# Patient Record
Sex: FEMALE | Race: WHITE | Employment: OTHER | URBAN - METROPOLITAN AREA
[De-identification: names, ages, dates, MRNs, and addresses within clinical notes are randomized per-mention and may not be internally consistent; named-entity substitution may affect disease eponyms.]

---

## 2024-11-21 NOTE — DISCHARGE INSTRUCTIONS
POST OPERATIVE INFORMATION & INSTRUCTIONS FOLLOWING ROBOTIC OR OPEN ABDOMINAL SURGERY  WHAT TO EXPECT AT HOME:     Recovery from surgery is generally 2-6 weeks, but sometimes longer for more strenuous activity.  It is normal to be very tired during this time.     Recuperation varies with each individual.  Some people recover more quickly than others.  Do not be discouraged if you need a little longer to recover.  It is common to have pain along the incisions, temporary bloating/gas pain, or shoulder pain after robotic surgery. This should improve with time and activity.   It often takes several weeks before bladder function returns to normal. It is common for many patients to experience some mild leakage, need to urinate often and immediately. If these problems are persistent and bothersome, please call your doctor's office or discuss at your post-operative visit  If you also had a vaginal surgery, it is normal to have some drainage/discharge or a small amount of vaginal bleeding after surgery which may last up to 6 weeks. You may also pass small pieces of suture for 4-6 weeks after surgery.  This is normal, and stitches are absorbable.     INCISIONS  Showering and bathing:   It is okay to shower 24 hours after your surgery.   Avoid baths/swimming/hot tubs/pools for 4 weeks or until your incisions completely heal. Keeping incisions underwater can affect the healing process.   Your incisions are closed with absorbable sutures and skin glue or surgical tape on top   You may let soapy water run over the incision. There is no need to scrub the incision. Allow the skin glue or surgical tape to fall off on its own.     ACTIVITY   Lifting: No more than 10 pounds for 6 weeks. For reference, a full gallon of milk is 10 pounds. Thus, no lifting laundry, groceries, children, or pets or pushing heavy vacuums, or grocery carts.  No high impact exercises (aerobic activity, using exercise machines, weight-lifting etc.) for 6  weeks.  We encourage you to start walking regularly starting the day after surgery.   You may climb stairs as tolerated  You may return to work 1-4 weeks after surgery, depending on your job and your surgery.  Ask your doctor about your specific situation. Please contact your doctor's office if you need any [id number] letters or medical leave paperwork completed.   Do not put anything in your vagina for 6 weeks after surgery unless otherwise instructed by your doctor (including tampons, douching, sexual intercourse, etc.).     When you begin to have sexual intercourse again, use water soluble lubricants (e.g., K-Y Jelly) for a short period of time. Most of the discomfort that is experienced early improves with time; however, report any long-term discomfort to your doctor.   Do not drive until you feel that you are ready and can safely slam the brakes if needed. Do not drive while you are taking narcotic pain medication  Avoid sitting or lying in bed for more than 2 hours at a time while you are awake to reduce your risk of blood clots.     PAIN      Vaginal soreness and pelvic discomfort are normal for approximately 6 weeks after surgery.    The first 3 days after surgery we recommend that you rotate between Tylenol and ibuprofen so that you are taking one of these medications every 3 to 4 hours while awake. In this way, you can help prevent pain.   Tylenol* and Ibuprofen** should be the first medications you use for pain. Heat or ice may also help. Please take Ibuprofen with food. Some pain medications can cause constipation (see the section on constipation).   After 3 days, you can take Tylenol and Ibuprofen only as needed.   You may have been prescribed a narcotic~ pain medication (Oxycodone, Percocet, Norco, Tylenol #3, Valium, Tramadol, Hydrocodone).  Use narcotic pain medications for severe pain not improved by the above the first few days after surgery. Please transition to Tylenol or ibuprofen only  within the first 2-3 days after surgery if possible.      Pain management plan example:   Step 1: Over the counter medications  Extra strength Tylenol (500 mg) - take 2 tabs (1000 mg) every 6 hours  Ibuprofen (200 mg) - take 3 tabs (600 mg) every 6 hours    For example:   6 AM - take 1000 mg Tylenol  9AM - take 600 mg Ibuprofen  12 PM - again take 1000 mg Tylenol  3 PM - again take 600 mg Ibuprofen  So on and so on…  Step 2: Prescription pain medication  Oxycodone 5 mg: take 1 tab every 8 hours for additional pain if prescribed    PAIN MEDICATION INFORMATION:    For the first 2 days you should take Toradol and Tylenol alternating every 6 hours scheduled. Do not wait for the pain. For example take Toradol at 3pm, Tylenol at 6pm, Toradol at 9pm and so on. This way you can get ahead of any pain before it starts.    *The maximum amount of Tylenol you can take in a 24-hour period is 4000 mg. Taking over this amount could cause liver damage. Make sure that if you are taking additional narcotic pain medication it does not contain acetaminophen, the active ingredient in Tylenol. lf it does, you must account for in your daily total. For example: Norco or Percocet typically contain 325mg of Tylenol. Wean this medication as tolerated. Tylenol is processed by your liver. Do not take Tylenol if you are have a history of liver failure. Do not mix with alcohol.   **You may take 200-800mg of ibuprofen (Advil) every 8 hours as needed for pain after you are done taking the Toradol. Do not take Toradol and Ibuprofen together as they are the same type of medication. This will help with pain from inflammation (swelling). Ibuprofen and Toradol is processed by your kidneys. If you have any history of kidney disease you should avoid ibuprofen, Toradol and all types of NSAIDS (Aleve, Motrin, Advil). Please take ibuprofen with food. Avoid if you have a history of stomach ulcers. If you are taking the maximum dose of ibuprofen (800mg every 8  hours), reduce this amount to 200-400mg ibuprofen every 8 hours as your pain improves.   ~Remember that narcotics are addictive, sedating, and constipating.  You should be taking a stool softener once or twice a day while on this medication. If you are prescribed narcotic pain medication, please use the medication as instructed. Do not use more than instructed. Do not take this medication with alcohol, sedatives, anti-anxiety medications, or sleep aids.      ~ If prescribed, it is important to keep narcotic pills safely stored, as it is at great risk of being stolen or misused by family, friends, or even strangers. Please be sure to dispose of leftover pain medication after you have recovered. You may dispose of unused narcotic medications in the trash with an unpleasant substance such as coffee grounds or cat litter. You can also check FDA.gov to assess which medications can be safely flushed down the toilet.     CONSTIPATION  Miralax daily as a stool softener until you are off of narcotics and your bowel habits are back to normal.  If you have diarrhea, stop the stool softener.    If you have not had a bowel movement 2 days after surgery, you should take Milk of Magnesia, Dulcolax, Metamucil, magnesium citrate or other over-the-counter (OTC) laxatives for relief. Take laxatives with plenty of water.  Do not take Milk of Magnesia or magnesium citrate if you have chronic kidney disease.  If you had a rectocele repair, rectal pressure (feeling like you need to have a bowel movement) is also very common.  However, you should not strain to have a bowel movement or sit on the toilet for extended periods of time.  The feeling like you need to have a bowel movement may just be the swelling from surgery.   Do not use rectal suppositories if you had a rectocele repair for 4 weeks after surgery     HOME MEDICATIONS:  It is uncommon that you would receive an antibiotic prescription to use at home. You received antibiotics  during surgery while in the hospital.  Please resume all of your home medications that you were on before surgery immediately.  If you are on a blood thinning medication, please resume 1 day after surgery unless otherwise instructed.  If you were prescribed vaginal estrogen, you should resume it 6 weeks after surgery unless otherwise instructed.     URINATION AFTER SURGERY  Immediately after surgery, you may have a catheter in place. If so, you may experience urinary urgency and some bladder pressure/pain.  Although these symptoms are often associated with a urinary tract infection, they can also be caused by bladder spasms.  Call our office if you are having fevers in addition to urinary urgency, burning with urination, and bladder pain. A fever (Temp > 101.5 ? F) is more suggestive of an infection.  Before you leave the hospital, you may be asked to perform a voiding trial.  This tests your ability to urinate and empty your bladder after surgery.  If you are able to urinate, you will be discharged home without a catheter.    Even if you pass the voiding trial, there is a small chance that you can go into urinary retention (have difficulty urinating).  If you do not urinate within 4-6 hours of catheter removal and you feel like your bladder is full but you can't urinate, go to your local urologist, local emergency room, or our emergency room for catheter placement.  If this occurs, please call our office so we can schedule an appointment for another voiding trial after the swelling has had time to subside.  If you do not pass the voiding trial prior to discharge, then the nursing staff will replace a Velasquez catheter in your bladder until the swelling resolves.  You will need to follow-up in our clinic or your local urologist's office in 3-4 days to repeat the voiding trial. In some cases you may also be taught how to perform self-catheterization. If this is necessary, further instructions will be provided.    DIET  You can resume a regular diet once you are discharged.  We recommend a light diet at first if you have nausea or vomiting from pain medications or anesthesia.  We also recommend a high-fiber diet to help with bowel movements.    FOLLOW UP  Typical follow-up is between 3-4 weeks after surgery with your doctor's assistant or your doctor  Your doctor's office will contact you regarding the timing of your follow up appointment  Call the number below for your doctor during normal business hours for your follow up appointment(s)      WHEN SHOULD I CALL THE DOCTOR?  If you have a sudden onset of severe abdominal pain with nausea/vomiting please contact your doctor's office immediately.    Call your doctor if you experience any of the following symptoms:   Bright red vaginal bleeding that soaks a heavy pad  Temperature greater than 101.5 ?F (38.5?C)   Persistent vomiting   Worsening pain that is not relieved by over the counter and prescription pain medication   Large amounts of vaginal discharge that is foul smelling, yellow or green that does not improve.    If questions or concerns:   Call [phone] to reach your physician's office or send a [employer] message and either myself or one of my staff members will get back to you as soon as possible.     OR: Go to the nearest Emergency Room if you feel any signs of symptoms that warrant physician's immediate attention.     Beba Hardin DO, [facility]  [phone]       HOME INSTRUCTIONS  AMBSURG HOME CARE INSTRUCTIONS: POST-OP ANESTHESIA  The medication that you received for sedation or general anesthesia can last up to 24 hours. Your judgment and reflexes may be altered, even if you feel like your normal self.      We Recommend:   Do not drive any motor vehicle or bicycle   Avoid mowing the lawn, playing sports, or working with power tools/applicances (power saws, electric knives or mixers)   That you have someone stay with you on your first night home   Do  not drink alcohol or take sleeping pills or tranquilizers   Do not sign legal documents within 24 hours of your procedure   If you had a nerve block for your surgery, take extra care not to put any pressure on your arm or hand for 24 hours    It is normal:  For you to have a sore throat if you had a breathing tube during surgery (while you were asleep!). The sore throat should get better within 48 hours. You can gargle with warm salt water (1/2 tsp in 4 oz warm water) or use a throat lozenge for comfort  To feel muscle aches or soreness especially in the abdomen, chest or neck. The achy feeling should go away in the next 24 hours  To feel weak, sleepy or \"wiped out\". Your should start feeling better in the next 24 hours.   To experience mild discomforts such as sore lip or tongue, headache, cramps, gas pains or a bloated feeling in your abdomen.   To experience mild back pain or soreness for a day or two if you had spinal or epidural anesthesia.   If you had laparoscopic surgery, to feel shoulder pain or discomfort on the day of surgery.   For some patients to have nausea after surgery/anesthesia    If you feel nausea or experience vomiting:   Try to move around less.   Eat less than usual or drink only liquids until the next morning   Nausea should resolve in about 24 hours    If you have a problem when you are at home:    Call your surgeons office   Discharge Instructions: After Your Surgery  You’ve just had surgery. During surgery, you were given medicine called anesthesia to keep you relaxed and free of pain. After surgery, you may have some pain or nausea. This is common. Here are some tips for feeling better and getting well after surgery.   Going home  Your healthcare provider will show you how to take care of yourself when you go home. They'll also answer your questions. Have an adult family member or friend drive you home. For the first 24 hours after your surgery:   Don't drive or use heavy  equipment.  Don't make important decisions or sign legal papers.  Take medicines as directed.  Don't drink alcohol.  Have someone stay with you, if needed. They can watch for problems and help keep you safe.  Be sure to go to all follow-up visits with your healthcare provider. And rest after your surgery for as long as your provider tells you to.   Coping with pain  If you have pain after surgery, pain medicine will help you feel better. Take it as directed, before pain becomes severe. Also, ask your healthcare provider or pharmacist about other ways to control pain. This might be with heat, ice, or relaxation. And follow any other instructions your surgeon or nurse gives you.      Stay on schedule with your medicine.     Tips for taking pain medicine  To get the best relief possible, remember these points:   Pain medicines can upset your stomach. Taking them with a little food may help.  Most pain relievers taken by mouth need at least 20 to 30 minutes to start to work.  Don't wait till your pain becomes severe before you take your medicine. Try to time your medicine so that you can take it before starting an activity. This might be before you get dressed, go for a walk, or sit down for dinner.  Constipation is a common side effect of some pain medicines. Call your healthcare provider before taking any medicines such as laxatives or stool softeners to help ease constipation. Also ask if you should skip any foods. Drinking lots of fluids and eating foods such as fruits and vegetables that are high in fiber can also help. Remember, don't take laxatives unless your surgeon has prescribed them.  Drinking alcohol and taking pain medicine can cause dizziness and slow your breathing. It can even be deadly. Don't drink alcohol while taking pain medicine.  Pain medicine can make you react more slowly to things. Don't drive or run machinery while taking pain medicine.  Your healthcare provider may tell you to take  acetaminophen to help ease your pain. Ask them how much you're supposed to take each day. Acetaminophen or other pain relievers may interact with your prescription medicines or other over-the-counter (OTC) medicines. Some prescription medicines have acetaminophen and other ingredients in them. Using both prescription and OTC acetaminophen for pain can cause you to accidentally overdose. Read the labels on your OTC medicines with care. This will help you to clearly know the list of ingredients, how much to take, and any warnings. It may also help you not take too much acetaminophen. If you have questions or don't understand the information, ask your pharmacist or healthcare provider to explain it to you before you take the OTC medicine.   Managing nausea  Some people have an upset stomach (nausea) after surgery. This is often because of anesthesia, pain, or pain medicine, less movement of food in the stomach, or the stress of surgery. These tips will help you handle nausea and eat healthy foods as you get better. If you were on a special food plan before surgery, ask your healthcare provider if you should follow it while you get better. Check with your provider on how your eating should progress. It may depend on the surgery you had. These general tips may help:   Don't push yourself to eat. Your body will tell you when to eat and how much.  Start off with clear liquids and soup. They're easier to digest.  Next try semi-solid foods as you feel ready. These include mashed potatoes, applesauce, and gelatin.  Slowly move to solid foods. Don’t eat fatty, rich, or spicy foods at first.  Don't force yourself to have 3 large meals a day. Instead eat smaller amounts more often.  Take pain medicines with a small amount of solid food, such as crackers or toast. This helps prevent nausea.  When to call your healthcare provider  Call your healthcare provider right away if you have any of these:   You still have too much pain, or  the pain gets worse, after taking the medicine. The medicine may not be strong enough. Or there may be a complication from the surgery.  You feel too sleepy, dizzy, or groggy. The medicine may be too strong.  Side effects such as nausea or vomiting. Your healthcare provider may advise taking other medicines to .  Skin changes such as rash, itching, or hives. This may mean you have an allergic reaction. Your provider may advise taking other medicines.  The incision looks different (for instance, part of it opens up).  Bleeding or fluid leaking from the incision site, and weren't told to expect that.  Fever of 100.4°F (38°C) or higher, or as directed by your provider.  Call 911  Call 911 right away if you have:   Trouble breathing  Facial swelling    If you have obstructive sleep apnea   You were given anesthesia medicine during surgery to keep you comfortable and free of pain. After surgery, you may have more apnea spells because of this medicine and other medicines you were given. The spells may last longer than normal.    At home:  Keep using the continuous positive airway pressure (CPAP) device when you sleep. Unless your healthcare provider tells you not to, use it when you sleep, day or night. CPAP is a common device used to treat obstructive sleep apnea.  Talk with your provider before taking any pain medicine, muscle relaxants, or sedatives. Your provider will tell you about the possible dangers of taking these medicines.  Contact your provider if your sleeping changes a lot even when taking medicines as directed.

## 2024-11-22 ENCOUNTER — HOSPITAL ENCOUNTER (OUTPATIENT)
Dept: LAB | Facility: HOSPITAL | Age: 74
Discharge: HOME | End: 2024-11-22
Attending: STUDENT IN AN ORGANIZED HEALTH CARE EDUCATION/TRAINING PROGRAM
Payer: MEDICARE

## 2024-11-22 DIAGNOSIS — Z01.818 PRE-OP TESTING: ICD-10-CM

## 2024-11-22 LAB
ANTIBODY SCREEN: NEGATIVE
RH BLOOD TYPE: POSITIVE

## 2024-11-22 PROCEDURE — 86901 BLOOD TYPING SEROLOGIC RH(D): CPT | Performed by: STUDENT IN AN ORGANIZED HEALTH CARE EDUCATION/TRAINING PROGRAM

## 2024-11-22 PROCEDURE — 86900 BLOOD TYPING SEROLOGIC ABO: CPT | Performed by: STUDENT IN AN ORGANIZED HEALTH CARE EDUCATION/TRAINING PROGRAM

## 2024-11-22 PROCEDURE — 86850 RBC ANTIBODY SCREEN: CPT | Performed by: STUDENT IN AN ORGANIZED HEALTH CARE EDUCATION/TRAINING PROGRAM

## 2024-11-22 RX ORDER — LEVOTHYROXINE SODIUM 75 UG/1
75 TABLET ORAL
COMMUNITY

## 2024-11-22 RX ORDER — ATORVASTATIN CALCIUM 20 MG/1
20 TABLET, FILM COATED ORAL NIGHTLY
COMMUNITY

## 2024-11-22 RX ORDER — ESCITALOPRAM OXALATE 10 MG/1
10 TABLET ORAL DAILY
COMMUNITY

## 2024-11-24 NOTE — H&P
75yo F with vaginal bulge, MARKUS and UUI. Still has uterus. Has spotting with pessary. No abnormal PAP. +UTIs.     On VEC but not consistent. H/o endometriosis     Abd surgeries- appendix     Pelvic Surgeries: no      10 point ROS was completed and otherwise negative unless states in HPI           History/Other:         Current Outpatient Medications   Medication Sig Dispense Refill    estradiol (ESTRACE) 0.1 MG/GM Vaginal Cream Place 1 g vaginally twice a week. 1 each 3    citalopram 20 MG Oral Tab TAKE ONE AND ONE-HALF TABLETS DAILY 135 tablet 3    valACYclovir (VALTREX) 1 G Oral Tab 2 tablets at onset of cold sore, then 2 tablets 12 hours later. 12 tablet 3    Calcium Citrate-Vitamin D (CITRACAL + D OR) Take  by mouth.        ciprofloxacin (CIPRO) 500 MG Oral Tab Take 1 tablet (500 mg total) by mouth 2 (two) times daily. 6 tablet 0    PEG 3350-KCl-Na Bicarb-NaCl 420 g Oral Recon Soln Take 4,000 mL (4 L total) by mouth As Directed. 4000 mL 0    predniSONE 20 MG Oral Tab 2 tablets daily for 3 days 6 tablet 0    ondansetron 4 MG Oral Tablet Dispersible Take 1 tablet (4 mg total) by mouth every 6 (six) hours as needed for Nausea. 20 tablet 0    triamcinolone acetonide (ORALONE) 0.1 % Mouth/Throat Paste Apply 4 times a day to affected area after meals 5 g 1           Past Medical History:   Diagnosis Date    Anxiety state      OTHER DISEASES       H/O PPD(+) - RX X 6 MONTHS IN 6TH GRADE, H/OO PRE-CANCEROUS SKIN LESION, HERPES LABIALIS    Tubular adenoma 11/14/2023    Vitamin D deficiency 12/27/2013              Past Surgical History:   Procedure Laterality Date    COLONOSCOPY N/A 10/27/2023     Procedure: COLONOSCOPY w/ polypectomy;  Surgeon: Jonnie Oreilly MD;  Location: [facility], [facility]    OTHER SURGICAL HISTORY         VENTRAL HERNIA REPAIR, SCLEROTHERAPY    TUBAL LIGATION           Social History    Tobacco Use      Smoking status: Never      Smokeless tobacco: Never    Vaping Use      Vaping status: Never  Used    Alcohol use: Yes      Comment: SOCIAL    Drug use: No               Family History   Problem Relation Age of Onset    Hypertension Father      Lipids Father      Breast Cancer Sister 43         Age at dx 43    Other (Other) Sister           HYPOTHYROID    Hormone Disorder Mother           hysterectomy young age    Heart Disease Maternal Grandmother           cva    Hypertension Maternal Grandmother      Heart Disease Maternal Grandfather           cva    Heart Disease Paternal Grandmother           cva    Hypertension Paternal Grandmother      Hypertension Paternal Grandfather      Heart Disease Paternal Grandfather           cva               Objective:  GENERAL: well developed, well nourished, in no apparent distress  HEENT: atraumatic, normocephalic  LUNGS: normal respiratory motion without distress  CARDIO:NA  ABDOMEN: Soft, non-tender, non-distended  : Normal external genitalia with no lesions or discharge: yes; Vaginal Atrophy yes  Ba +1.5 C -4  Bp +0.5 gh 5 TVL 8 D -5  Pelvic Floor TTP: none  MARKUS: +  Masses Appreciated: no  NEURO: Alert, oriented, no focal deficits   EXTREMITIES: Edema no     Cystometrics: no                 Lab Results   Component Value Date     BILIRUBIN Negative 10/04/2023     SPECGRAVITY 1.006 10/04/2023     BLOODU Negative 10/04/2023     UROBILIN <=1.0 10/04/2023     NITRITE Positive (A) 10/04/2023            PVR: 2ml                 Assessment & Plan:  73yo F  Diagnoses and all orders for this visit:     MARKUS (stress urinary incontinence, female)  -     MEASUREMENT, POST-VOIDING RESIDUAL URINE &/OR BLADDER CAPACITY, US, NON-IMAGING     Urge incontinence of urine     Midline cystocele     Incomplete uterovaginal prolapse     Rectocele     Plan:  - Assessment/Plan:  1. Pelvic organ prolapse  Discussed the natural history of pelvic organ prolapse. Prolapse is unlikely to self-resolve, although may not necessarily worsen with time with care in avoidance of abdominal straining,  heavy lifting, and weight gain.  All the treatment options were reviewed including      1) Watchful waiting particularly if symptoms are non-bothersome. Should have a good bowel regimen, avoiding constipation and straining. Avoiding heavy weightlifting if possible. Exhaling with lifting to decrease transmission of force to pelvis.       2) Kegel exercise/strenthing - would not anatomically correct the prolapse but may decrease the sensation of a vaginal bulge.       3) pessary placement- would require removal and cleaning regularly (not necessarily daily) that could be performed by the patient with proper instruction or managed by our physician assistant.       4) Surgical repair - which would be an outpatient procedure and require pelvic rest + lifting restrictions for at least 6 weeks.      -Surgical options including vaginal, robotic and open abdominal operations were discussed at length as well as the risks and benefits incurred by treatment option.     -A concurrent mid-urethral sling procedure was also discussed for the treatment of MARKUS/prevention of de linh MARKUS after prolapse repair. All relevant recent data discussed with patient as well as risks associate with mesh.     Given the above the patient has elected to undergo robotic assisted sacrocolopopexy, supracervical hysterectomy, bilateral salpingectomy, posterior repair, midurethral sling placement and cystoscopy after thorough education provided        The patient indicates understanding of these issues and agrees to the plan.      Beba Hardin DO, [facility]

## 2024-11-25 ENCOUNTER — ANESTHESIA EVENT (OUTPATIENT)
Dept: SURGERY | Facility: HOSPITAL | Age: 74
End: 2024-11-25
Payer: MEDICARE

## 2024-11-25 ENCOUNTER — HOSPITAL ENCOUNTER (OUTPATIENT)
Facility: HOSPITAL | Age: 74
Setting detail: HOSPITAL OUTPATIENT SURGERY
Discharge: HOME | End: 2024-11-25
Attending: STUDENT IN AN ORGANIZED HEALTH CARE EDUCATION/TRAINING PROGRAM | Admitting: STUDENT IN AN ORGANIZED HEALTH CARE EDUCATION/TRAINING PROGRAM
Payer: MEDICARE

## 2024-11-25 ENCOUNTER — ANESTHESIA (OUTPATIENT)
Dept: SURGERY | Facility: HOSPITAL | Age: 74
End: 2024-11-25
Payer: MEDICARE

## 2024-11-25 VITALS
OXYGEN SATURATION: 94 % | RESPIRATION RATE: 15 BRPM | HEIGHT: 65 IN | SYSTOLIC BLOOD PRESSURE: 131 MMHG | DIASTOLIC BLOOD PRESSURE: 48 MMHG | BODY MASS INDEX: 24.61 KG/M2 | HEART RATE: 80 BPM | WEIGHT: 147.69 LBS | TEMPERATURE: 98 F

## 2024-11-25 DIAGNOSIS — Z01.818 PRE-OP TESTING: Primary | ICD-10-CM

## 2024-11-25 DIAGNOSIS — N81.2 INCOMPLETE UTEROVAGINAL PROLAPSE: ICD-10-CM

## 2024-11-25 LAB — RH BLOOD TYPE: POSITIVE

## 2024-11-25 PROCEDURE — 0UT94ZL RESECTION OF UTERUS, SUPRACERVICAL, PERCUTANEOUS ENDOSCOPIC APPROACH: ICD-10-PCS | Performed by: STUDENT IN AN ORGANIZED HEALTH CARE EDUCATION/TRAINING PROGRAM

## 2024-11-25 PROCEDURE — 88307 TISSUE EXAM BY PATHOLOGIST: CPT | Performed by: STUDENT IN AN ORGANIZED HEALTH CARE EDUCATION/TRAINING PROGRAM

## 2024-11-25 PROCEDURE — 0USG4ZZ REPOSITION VAGINA, PERCUTANEOUS ENDOSCOPIC APPROACH: ICD-10-PCS | Performed by: STUDENT IN AN ORGANIZED HEALTH CARE EDUCATION/TRAINING PROGRAM

## 2024-11-25 PROCEDURE — 8E0W4CZ ROBOTIC ASSISTED PROCEDURE OF TRUNK REGION, PERCUTANEOUS ENDOSCOPIC APPROACH: ICD-10-PCS | Performed by: STUDENT IN AN ORGANIZED HEALTH CARE EDUCATION/TRAINING PROGRAM

## 2024-11-25 PROCEDURE — 0TSD4ZZ REPOSITION URETHRA, PERCUTANEOUS ENDOSCOPIC APPROACH: ICD-10-PCS | Performed by: STUDENT IN AN ORGANIZED HEALTH CARE EDUCATION/TRAINING PROGRAM

## 2024-11-25 PROCEDURE — 0UT74ZZ RESECTION OF BILATERAL FALLOPIAN TUBES, PERCUTANEOUS ENDOSCOPIC APPROACH: ICD-10-PCS | Performed by: STUDENT IN AN ORGANIZED HEALTH CARE EDUCATION/TRAINING PROGRAM

## 2024-11-25 DEVICE — TRADITIONAL Y MESH
Type: IMPLANTABLE DEVICE | Site: VAGINA | Status: FUNCTIONAL
Brand: UPSYLON™

## 2024-11-25 DEVICE — TRANSVAGINAL MID-URETHRAL SLING
Type: IMPLANTABLE DEVICE | Site: VAGINA | Status: FUNCTIONAL
Brand: ADVANTAGE FIT™ BLUE SYSTEM

## 2024-11-25 RX ORDER — HYDROMORPHONE HYDROCHLORIDE 1 MG/ML
0.4 INJECTION, SOLUTION INTRAMUSCULAR; INTRAVENOUS; SUBCUTANEOUS EVERY 5 MIN PRN
Status: DISCONTINUED | OUTPATIENT
Start: 2024-11-25 | End: 2024-11-25

## 2024-11-25 RX ORDER — HYDROMORPHONE HYDROCHLORIDE 1 MG/ML
0.6 INJECTION, SOLUTION INTRAMUSCULAR; INTRAVENOUS; SUBCUTANEOUS EVERY 5 MIN PRN
Status: DISCONTINUED | OUTPATIENT
Start: 2024-11-25 | End: 2024-11-25

## 2024-11-25 RX ORDER — KETOROLAC TROMETHAMINE 10 MG/1
10 TABLET, FILM COATED ORAL EVERY 6 HOURS PRN
Qty: 20 TABLET | Refills: 0 | Status: SHIPPED | OUTPATIENT
Start: 2024-11-25 | End: 2024-11-30

## 2024-11-25 RX ORDER — ROCURONIUM BROMIDE 10 MG/ML
INJECTION, SOLUTION INTRAVENOUS AS NEEDED
Status: DISCONTINUED | OUTPATIENT
Start: 2024-11-25 | End: 2024-11-25 | Stop reason: SURG

## 2024-11-25 RX ORDER — SCOLOPAMINE TRANSDERMAL SYSTEM 1 MG/1
1 PATCH, EXTENDED RELEASE TRANSDERMAL
Status: DISCONTINUED | OUTPATIENT
Start: 2024-11-25 | End: 2024-11-25 | Stop reason: HOSPADM

## 2024-11-25 RX ORDER — MIDAZOLAM HYDROCHLORIDE 1 MG/ML
INJECTION INTRAMUSCULAR; INTRAVENOUS AS NEEDED
Status: DISCONTINUED | OUTPATIENT
Start: 2024-11-25 | End: 2024-11-25 | Stop reason: SURG

## 2024-11-25 RX ORDER — ACETAMINOPHEN 500 MG
1000 TABLET ORAL ONCE
Status: COMPLETED | OUTPATIENT
Start: 2024-11-25 | End: 2024-11-25

## 2024-11-25 RX ORDER — SODIUM CHLORIDE 9 MG/ML
INJECTION, SOLUTION INTRAVENOUS CONTINUOUS PRN
Status: DISCONTINUED | OUTPATIENT
Start: 2024-11-25 | End: 2024-11-25 | Stop reason: SURG

## 2024-11-25 RX ORDER — LIDOCAINE HYDROCHLORIDE 10 MG/ML
INJECTION, SOLUTION EPIDURAL; INFILTRATION; INTRACAUDAL; PERINEURAL AS NEEDED
Status: DISCONTINUED | OUTPATIENT
Start: 2024-11-25 | End: 2024-11-25 | Stop reason: SURG

## 2024-11-25 RX ORDER — HYDRALAZINE HYDROCHLORIDE 20 MG/ML
INJECTION INTRAMUSCULAR; INTRAVENOUS AS NEEDED
Status: DISCONTINUED | OUTPATIENT
Start: 2024-11-25 | End: 2024-11-25 | Stop reason: SURG

## 2024-11-25 RX ORDER — MORPHINE SULFATE 4 MG/ML
2 INJECTION, SOLUTION INTRAMUSCULAR; INTRAVENOUS EVERY 10 MIN PRN
Status: DISCONTINUED | OUTPATIENT
Start: 2024-11-25 | End: 2024-11-25

## 2024-11-25 RX ORDER — MORPHINE SULFATE 4 MG/ML
4 INJECTION, SOLUTION INTRAMUSCULAR; INTRAVENOUS EVERY 10 MIN PRN
Status: DISCONTINUED | OUTPATIENT
Start: 2024-11-25 | End: 2024-11-25

## 2024-11-25 RX ORDER — ONDANSETRON 2 MG/ML
INJECTION INTRAMUSCULAR; INTRAVENOUS AS NEEDED
Status: DISCONTINUED | OUTPATIENT
Start: 2024-11-25 | End: 2024-11-25 | Stop reason: SURG

## 2024-11-25 RX ORDER — ESMOLOL HYDROCHLORIDE 10 MG/ML
INJECTION INTRAVENOUS AS NEEDED
Status: DISCONTINUED | OUTPATIENT
Start: 2024-11-25 | End: 2024-11-25 | Stop reason: SURG

## 2024-11-25 RX ORDER — NALOXONE HYDROCHLORIDE 0.4 MG/ML
0.08 INJECTION, SOLUTION INTRAMUSCULAR; INTRAVENOUS; SUBCUTANEOUS AS NEEDED
Status: DISCONTINUED | OUTPATIENT
Start: 2024-11-25 | End: 2024-11-25

## 2024-11-25 RX ORDER — HEPARIN SODIUM 5000 [USP'U]/ML
5000 INJECTION, SOLUTION INTRAVENOUS; SUBCUTANEOUS ONCE
Status: COMPLETED | OUTPATIENT
Start: 2024-11-25 | End: 2024-11-25

## 2024-11-25 RX ORDER — PHENAZOPYRIDINE HYDROCHLORIDE 200 MG/1
200 TABLET, FILM COATED ORAL ONCE
Status: COMPLETED | OUTPATIENT
Start: 2024-11-25 | End: 2024-11-25

## 2024-11-25 RX ORDER — MORPHINE SULFATE 10 MG/ML
6 INJECTION, SOLUTION INTRAMUSCULAR; INTRAVENOUS EVERY 10 MIN PRN
Status: DISCONTINUED | OUTPATIENT
Start: 2024-11-25 | End: 2024-11-25

## 2024-11-25 RX ORDER — SODIUM CHLORIDE, SODIUM LACTATE, POTASSIUM CHLORIDE, CALCIUM CHLORIDE 600; 310; 30; 20 MG/100ML; MG/100ML; MG/100ML; MG/100ML
INJECTION, SOLUTION INTRAVENOUS CONTINUOUS
Status: DISCONTINUED | OUTPATIENT
Start: 2024-11-25 | End: 2024-11-25

## 2024-11-25 RX ORDER — KETOROLAC TROMETHAMINE 15 MG/ML
15 INJECTION, SOLUTION INTRAMUSCULAR; INTRAVENOUS ONCE
Status: COMPLETED | OUTPATIENT
Start: 2024-11-25 | End: 2024-11-25

## 2024-11-25 RX ORDER — POLYETHYLENE GLYCOL 3350 17 G/17G
17 POWDER, FOR SOLUTION ORAL DAILY PRN
Qty: 72 EACH | Refills: 0 | Status: SHIPPED | OUTPATIENT
Start: 2024-11-25 | End: 2024-12-25

## 2024-11-25 RX ORDER — DEXAMETHASONE SODIUM PHOSPHATE 4 MG/ML
VIAL (ML) INJECTION AS NEEDED
Status: DISCONTINUED | OUTPATIENT
Start: 2024-11-25 | End: 2024-11-25 | Stop reason: SURG

## 2024-11-25 RX ORDER — BUPIVACAINE HYDROCHLORIDE 2.5 MG/ML
INJECTION, SOLUTION EPIDURAL; INFILTRATION; INTRACAUDAL AS NEEDED
Status: DISCONTINUED | OUTPATIENT
Start: 2024-11-25 | End: 2024-11-25 | Stop reason: HOSPADM

## 2024-11-25 RX ORDER — HYDROMORPHONE HYDROCHLORIDE 1 MG/ML
0.2 INJECTION, SOLUTION INTRAMUSCULAR; INTRAVENOUS; SUBCUTANEOUS EVERY 5 MIN PRN
Status: DISCONTINUED | OUTPATIENT
Start: 2024-11-25 | End: 2024-11-25

## 2024-11-25 RX ADMIN — DEXAMETHASONE SODIUM PHOSPHATE 4 MG: 4 MG/ML VIAL (ML) INJECTION at 12:06:00

## 2024-11-25 RX ADMIN — ESMOLOL HYDROCHLORIDE 40 MG: 10 INJECTION INTRAVENOUS at 15:00:00

## 2024-11-25 RX ADMIN — ROCURONIUM BROMIDE 10 MG: 10 INJECTION, SOLUTION INTRAVENOUS at 13:35:00

## 2024-11-25 RX ADMIN — MIDAZOLAM HYDROCHLORIDE 2 MG: 1 INJECTION INTRAMUSCULAR; INTRAVENOUS at 11:31:00

## 2024-11-25 RX ADMIN — HYDRALAZINE HYDROCHLORIDE 2 MG: 20 INJECTION INTRAMUSCULAR; INTRAVENOUS at 12:27:00

## 2024-11-25 RX ADMIN — HYDRALAZINE HYDROCHLORIDE 2 MG: 20 INJECTION INTRAMUSCULAR; INTRAVENOUS at 13:35:00

## 2024-11-25 RX ADMIN — ROCURONIUM BROMIDE 50 MG: 10 INJECTION, SOLUTION INTRAVENOUS at 11:39:00

## 2024-11-25 RX ADMIN — HYDRALAZINE HYDROCHLORIDE 4 MG: 20 INJECTION INTRAMUSCULAR; INTRAVENOUS at 12:44:00

## 2024-11-25 RX ADMIN — ONDANSETRON 4 MG: 2 INJECTION INTRAMUSCULAR; INTRAVENOUS at 14:27:00

## 2024-11-25 RX ADMIN — SODIUM CHLORIDE: 9 INJECTION, SOLUTION INTRAVENOUS at 11:32:00

## 2024-11-25 RX ADMIN — ROCURONIUM BROMIDE 10 MG: 10 INJECTION, SOLUTION INTRAVENOUS at 13:24:00

## 2024-11-25 RX ADMIN — SODIUM CHLORIDE: 9 INJECTION, SOLUTION INTRAVENOUS at 12:14:00

## 2024-11-25 RX ADMIN — SODIUM CHLORIDE, SODIUM LACTATE, POTASSIUM CHLORIDE, CALCIUM CHLORIDE: 600; 310; 30; 20 INJECTION, SOLUTION INTRAVENOUS at 11:28:00

## 2024-11-25 RX ADMIN — LIDOCAINE HYDROCHLORIDE 50 MG: 10 INJECTION, SOLUTION EPIDURAL; INFILTRATION; INTRACAUDAL; PERINEURAL at 11:39:00

## 2024-11-25 NOTE — ANESTHESIA PREPROCEDURE EVALUATION
Anesthesia PreOp Note    HPI:     Arabella Dwyer is a 74 year old female who presents for preoperative consultation requested by: Beba Hardin DO    Date of Surgery: 11/25/2024    Procedure(s):  Robotic assisted sacrocolpopexy, supracervical hysterectomy, bilateral salpingectomy, posterior repair, midurethral sling placement, cystoscopy  XI ROBOT-ASSISTED LAPAROSCOPIC HYSTERECTOMY  ANTERIOR POSTERIOR REPAIR  CYSTOSCOPY  PUBO VAGINAL SLING  Indication: Complete uterovaginal prolapse, rectocele, stress urinary incontinence    Relevant Problems   No relevant active problems       NPO:                         History Review:  There are no active problems to display for this patient.      Past Medical History:    Anxiety state    Disorder of thyroid    High cholesterol    Hx of motion sickness       Past Surgical History:   Procedure Laterality Date    Appendectomy         Prescriptions Prior to Admission[1]  Current Medications and Prescriptions Ordered in Epic[2]    Allergies[3]    Family History   Problem Relation Age of Onset    Heart Disorder Father     Heart Disorder Mother      Social History     Socioeconomic History    Marital status:    Tobacco Use    Smoking status: Never    Smokeless tobacco: Never   Vaping Use    Vaping status: Never Used   Substance and Sexual Activity    Alcohol use: Yes     Comment: socially    Drug use: Never       Available pre-op labs reviewed.             Vital Signs:  Body mass index is 24.46 kg/m².   height is 1.651 m (5' 5\") and weight is 66.7 kg (147 lb).   Vitals:    11/22/24 0843   Weight: 66.7 kg (147 lb)   Height: 1.651 m (5' 5\")        Anesthesia Evaluation      Airway   Mallampati: II  TM distance: >3 FB  Neck ROM: full  Dental - Dentition appears grossly intact     Pulmonary - normal exam   Cardiovascular - normal exam  Exercise tolerance: good    Neuro/Psych    (+)  anxiety/panic attacks,        GI/Hepatic/Renal      Endo/Other    Abdominal                   Anesthesia Plan:   ASA:  2  Plan:   General  Monitors and Lines:   BIS  Airway:  ETT  Informed Consent Plan and Risks Discussed With:  Patient and spouse      I have informed Arabella Dwyer and/or legal guardian or family member of the nature of the anesthetic plan, benefits, risks including possible dental damage if relevant, major complications, and any alternative forms of anesthetic management.   All of the patient's questions were answered to the best of my ability. The patient desires the anesthetic management as planned.  Carito Trivedi MD  11/25/2024 10:20 AM  Present on Admission:  **None**           [1]   Medications Prior to Admission   Medication Sig Dispense Refill Last Dose/Taking    levothyroxine 75 MCG Oral Tab Take 1 tablet (75 mcg total) by mouth before breakfast. Daily except Sundays   Taking    atorvastatin 20 MG Oral Tab Take 1 tablet (20 mg total) by mouth nightly.   Taking    escitalopram 10 MG Oral Tab Take 1 tablet (10 mg total) by mouth daily.   Taking    Cholecalciferol (VITAMIN D3) 25 MCG (1000 UT) Oral Cap Take 1 tablet by mouth daily.   11/18/2024    Multiple Vitamins-Minerals (MULTIVITAMIN GUMMIES ADULTS OR) Take 1 Dose by mouth daily.   11/18/2024    Potassium Citrate-Mag Citrate 100-15 MG Oral Tab CR Take 200 mg by mouth daily.   11/18/2024   [2]   Current Facility-Administered Medications Ordered in Epic   Medication Dose Route Frequency Provider Last Rate Last Admin    lactated ringers infusion   Intravenous Continuous Beba Hardin DO        acetaminophen (Tylenol Extra Strength) tab 1,000 mg  1,000 mg Oral Once Beba Hardin DO        ceFAZolin (Ancef) 2g in 10mL IV syringe premix  2 g Intravenous Once Beba Hardin DO        heparin (Porcine) 5000 UNIT/ML injection 5,000 Units  5,000 Units Subcutaneous Once Beba Hardin,         phenazopyridine (Pyridum) tab 200 mg  200 mg Oral Once Beba Hardin,          No current [facility]-ordered outpatient medications on file.   [3] No  Known Allergies

## 2024-11-25 NOTE — ANESTHESIA POSTPROCEDURE EVALUATION
Patient: Arabella Dwyer    Procedure Summary       Date: 11/25/24 Room / Location: [facility] MAIN OR  / [facility] MAIN OR    Anesthesia Start: 1128 Anesthesia Stop: 1511    Procedures:       Robotic assisted sacrocolpopexy, supracervical hysterectomy, uterus less than 250 grams, bilateral salpingectomy, midurethral sling placement, cystoscopy, perineorrhaphy (Abdomen)      XI ROBOT-ASSISTED LAPAROSCOPIC HYSTERECTOMY (Abdomen)      CYSTOSCOPY (Urethra)      PUBO VAGINAL SLING (Vagina ) Diagnosis: (Complete uterovaginal prolapse, rectocele, stress urinary incontinence)    Surgeons: Beba Hardin DO Anesthesiologist: Carito Trivedi MD    Anesthesia Type: general ASA Status: 2            Anesthesia Type: general    Vitals Value Taken Time   /61 11/25/24 1520   Temp 97.8 °F (36.6 °C) 11/25/24 1510   Pulse 78 11/25/24 1523   Resp 13 11/25/24 1523   SpO2 99 % 11/25/24 1523   Vitals shown include unfiled device data.    [facility] AN Post Evaluation:   Patient Evaluated in PACU  Patient Participation: complete - patient participated  Level of Consciousness: awake  Pain Management: adequate  Airway Patency:patent  Yes    Nausea/Vomiting: none  Cardiovascular Status: hemodynamically stable  Respiratory Status: acceptable  Postoperative Hydration stable      Carito Trivedi MD  11/25/2024 3:24 PM

## 2024-11-25 NOTE — ANESTHESIA PROCEDURE NOTES
Airway  Date/Time: 11/25/2024 11:39 AM  Urgency: Elective    Airway not difficult    General Information and Staff    Patient location during procedure: OR  Anesthesiologist: Carito Trivedi MD  Performed: anesthesiologist   Performed by: Carito Trivedi MD  Authorized by: Carito Trivedi MD      Indications and Patient Condition  Indications for airway management: anesthesia  Spontaneous Ventilation: absent  Sedation level: deep  Preoxygenated: yes  Patient position: sniffing  Mask difficulty assessment: 1 - vent by mask    Final Airway Details  Final airway type: endotracheal airway      Successful airway: ETT  Cuffed: yes   Successful intubation technique: Video laryngoscopy  Facilitating devices/methods: intubating stylet  Endotracheal tube insertion site: oral  Blade: GlideScope  Blade size: #3  ETT size (mm): 7.0    Cormack-Lehane Classification: grade IIA - partial view of glottis  Placement verified by: capnometry   Measured from: lips  ETT to lips (cm): 20  Number of attempts at approach: 1  Ventilation between attempts: none  Number of other approaches attempted: 0    Additional Comments  Atraumatic, dentition intact.  Oral gastric suction.

## 2024-11-25 NOTE — ANESTHESIA PROCEDURE NOTES
Peripheral IV  Date/Time: 11/25/2024 11:44 AM  Inserted by: Carito Trivedi MD    Placement  Needle size: 18 G  Laterality: right  Location: forearm  Local anesthetic: none  Site prep: chlorhexidine  Technique: anatomical landmarks  Attempts: 1 (Sterile dressing)

## 2024-11-25 NOTE — INTERVAL H&P NOTE
Pre-op Diagnosis: Complete uterovaginal prolapse, rectocele, stress urinary incontinence    The above referenced H&P was reviewed by Beba Hardin DO on 11/25/2024, the patient was examined and no significant changes have occurred in the patient's condition since the H&P was performed.  I discussed with the patient and/or legal representative the potential benefits, risks and side effects of this procedure; the likelihood of the patient achieving goals; and potential problems that might occur during recuperation.  I discussed reasonable alternatives to the procedure, including risks, benefits and side effects related to the alternatives and risks related to not receiving this procedure.  We will proceed with procedure as planned.

## 2024-11-25 NOTE — OPERATIVE REPORT
[facility]  part of [facility]    Operative Note         Arabella Dwyer Location: OR   CSN [id number] MRN [id number]   Admission Date 11/25/2024 Operation Date 11/25/2024   Attending Physician Beba Hardin DO       Patient Name: Arabella Dwyer     Preoperative Diagnosis: Complete uterovaginal prolapse, rectocele, stress urinary incontinence     Postoperative Diagnosis: Complete uterovaginal prolapse, rectocele, stress urinary incontinence     Procedure(s):  Robotic assisted sacrocolpopexy, supracervical hysterectomy, uterus less than 250 grams, bilateral salpingectomy, midurethral sling placement, cystoscopy, perineorrhaphy      Primary Surgeon: Beba Hardin DO     Surgical Assistant.: William Pham     Anesthesia: General     Specimen:   ID Type Source Tests Collected by Time Destination   1 : 1. Uterus and bilateral fallopian tubes Tissue Uterus, fallopian tube(s) SURGICAL PATHOLOGY TISSUE Beba Hardin DO 11/25/2024 1251         Estimated Blood Loss: 40ml  Complexity: Stage IV prolapse with floppy bladder, small atrophic cervix and redundant vagina made dissection prolonged and more difficult  Complications: none      Surgical Findings:   -Redundant floppy vagina and bladder from severe prolapse  -Complete reduction of prolapse  -b/l ureteral efflux      Operative Summary:       73yo female with bothersome stage IV uterovaginal prolapse. She was counseled on management options for the prolapse including abdominal and vaginal approaches with or without uterine sparing. Given MARKUS on her preop exam, midurethral sling was also recommended. She elected to proceed in fully informed fashion after discussion of procedures, alternatives, benefits, and risks.     An 16-Fr catheter was placed in the bladder. We began gaining access to the abdomen with a Veress needle in the midline. The abdomen was insufflated and an 8mm port was then introduced. The abdominal cavity was inspected with a 30-degree  camera. Two additional 8 mm robotic ports were placed on the patient's left side. An additional robotic port was placed on the patient's right side and an 8 mm assistant airseal port. The skin was infiltrated with Marcaine prior to an incision at each port site. Each port was then visually watched in. Port sites were at least 8-9 cm away from each other in a straight across configuration. Airseal was used for the case. Once all ports were in place the robot was docked from the side.     Once the robotic arms were docked, monopolar chelsy, bipolar graspers and a prograsp were placed in the arms.      The peritoneum over the sacral promontory was incised. The anterior longitudinal ligament was cleaned off, a trough was created down the peritoneum and opened towards the apex of the vagina. The right ureter was visible throughout dissection and unharmed.     Attention was then turned to the peritoneum at the bladder reflection which was tented up and incised with Monopolar current to create the bladder flap which was advanced to peel the bladder away from the cervix. The vagina and bladder were very floppy and redundant from severe prolapse making dissection difficult. I backfilled filled the bladder after dissection and no injury noted. We proceeded to just above the site of the chatterjee balloon.  Peritoneum was dissected off post vag wall in similar fashion. We then started the supracervical hysterectomy on the right side. Both ovaries were inspected and appeared otherwise normal. The right uteroovarian ligament and mesosalpinx were cauterized to release the right fallopian tube. Bipolar energy was applied along the broad ligament to cauterize and incise down to the junction with the cervix. Care was taken to cauterize all uterine arteries encountered.  This process was repeated on the contralateral side without difficulty, cauterizing the left uteroovarian ligament and mesosalpinx to release the left fallopian tube.  Then the broad ligament was cauterized as above. The body of the uterus was then amputated from the cervix with monopolar scissors. This was also difficult because the cervix was so small and atrophic. I was able to amputate the uterus at the cervix however without getting into the vagina. The uterus was placed in the right paracolic gutter.       We then continued developing the anterior and posterior leafs of peritoneum from the vaginal wall. Once these areas were developed, Y-shaped mesh was delivered into the abdomen. Using 2-0 PDS, the Y mesh was affixed to the anterior vaginal wall and apex. The posterior arm of the mesh was fixated to the posterior vaginal wall and apex. Excess mesh was cut and removed. Once these were secured we laid the mesh on our previously developed peritoneal trough to the anterior longitudinal ligament. The EEA sizer was placed in the vagina to determine the mesh fixation point and a vaginal exam confirmed adequate prolapse reduction without hypersuspension. Once that was evaluated, we then placed 0-0 Prolene to fix the mesh to the sacral promontory. The mesh was sutured at two places to the sacral promontory. We then retroperitonealized the mesh using an 0-0 V-lock absorbable suture in a running fashion. No bleeding was seen on inspection. Cystoscopy confirmed no sutures in the bladder and bilateral ureteral efflux.     The uterus and fallopian tubes were placed in a specimen bag. The ports were then withdrawn under direct visualization. Pneumoperitoneum was let down and then the robot undocked. The uterus and fallopian tubes were removed from the supraumbilical incision by extending the incision slightly. The supraumbilical fascial incision was then closed with Vicryl 0-0 sutures in a running fashion to adequately close the fascia. All wounds were irrigated copiously, the skin at all ports was closed using 4-0 Vicryl subcuticular fashion. Dermabond was applied over them.      Next I  moved onto the sling portion. Lidocaine with 1:100K parts epi was used to hydrodissect the anterior vaginal wall 1 cm distal to the urethral meatus.  A 15 blade was used to make a 2 cm incision through the vaginal epithelium in this area anteriorly.  More local was used to dissect flaps in the fornixes lateral to the incision.  Then, using Metzenbaum scissors flaps were raised to accommodate the trocars.     A 15 blade was used to two stable incisions approximately 2 cm from midline on the R and L in the mons overlying the pubic bone.  The trocars for the retropubic [employer] sling were then passed from the vaginal pocket and on both the L and R side to the mons incision using down-up approach .  The vagina was inspected to confirm no epithelial injury in the vaginal sulcus.  A cysto with a 70-degree lens revealed the left trocar going through the bladder. This trocar was removed and replaced. A ycstoscopy was done again which now showed both trocars in good position, rolling over the bladder epithelium without evidence of injury to the bladder or urethra.      Then the mesh sling was attached to the trocars and pulled through. The sling was allowed to rest gently anterior to the urethra off tension.  Curved pompa scissors were passed behind the sling in front of the urethra to confirm a good distance between the implant and tissue. The plastic coating was pulled off the sling and the mesh was trimmed at the level of the skin incision.     Then the vaginal incision was closed with a 2.0 vicryl in a running fashion. Every other stitch was locked. On final assessment, the vaginal vault was hemostatic. The stab incisions were closed with 4-0 Monocryl.         Next, attention was then turned to the perineorrhaphy. Two Allis clamps were placed along the hymenal ring at the posterior aspect.  Lidocaine with epi was injected below the vaginal epithelium in the area of the widened hiatus. A john  incision was made through the perineal epithelium with knife. The perineal body was rebuilt also with 0-0 Vicryl. Next, the vaginal and perineal incision was closed using running locked suture of 2-0 Vicryl.  The vaginal vault was hemostatic at case conclusion. Vaginal sweep negative.     All sponge, instrument, and needle counts were reported as correct. The chatterjee catheter was left in place and will be removed in PACU for a voiding trial.     The patient was cleaned, dried, and repositioned supine. She was extubated and transported to the recovery room for further postoperative monitoring.    Implants:   Implant Name Type Inv. Item Serial No.  Lot No. LRB No. Used Action   MESH LINDA VAG  YANIRA Y LTWT LO SURF AREA - SN/A  MESH LINDA VAG  YANIRA Y LTWT LO SURF AREA N/A [employer] WD [id number] N/A 1 Implanted   SYSTEM SLNG TIFFANIE PELV FLR MID URETH TRNSVAG - SN/A  SYSTEM SLNG TIFFANIE PELV FLR MID URETH TRNSVAG N/A [employer] WD [id number] N/A 1 Implanted        Drains: 16 F chatterjee catheter to PACU     Condition: stable       Beba Hardin DO

## (undated) DEVICE — SOLUTION IRRIG 1000ML ST H2O AQUALITE PLAS

## (undated) DEVICE — CANNULA SEAL

## (undated) DEVICE — SLEEVE COMPR MD KNEE LEN SGL USE KENDALL SCD

## (undated) DEVICE — POWDER HEMSTAT 3GM OXIDIZED REGENERATED CELOS

## (undated) DEVICE — Device: Brand: JELCO

## (undated) DEVICE — SOLUTION IRRIG 1000ML 0.9% NACL USP BTL

## (undated) DEVICE — TENACULUM FORCEPS: Brand: ENDOWRIST

## (undated) DEVICE — DRAPE,ROBOTICS,STERILE: Brand: MEDLINE

## (undated) DEVICE — VISUALIZATION SYSTEM: Brand: CLEARIFY

## (undated) DEVICE — TIP COVER ACCESSORY

## (undated) DEVICE — DRAPE, LAPAROSCOPY PELVISCOPY: Brand: MEDLINE

## (undated) DEVICE — DRAPE,UNDRBUT,WHT GRAD PCH,CAPPORT,20/CS: Brand: MEDLINE

## (undated) DEVICE — NDLCTR: FOAM/ADHESIVE 10CT 96/CS: Brand: MEDICAL ACTION INDUSTRIES

## (undated) DEVICE — AIRSEAL TRI-LUMEN LILTERED TUBE SET: Brand: AIRSEAL

## (undated) DEVICE — MEGA SUTURECUT ND: Brand: ENDOWRIST

## (undated) DEVICE — 20 ML SYRINGE LUER-LOCK TIP: Brand: MONOJECT

## (undated) DEVICE — PACK CUSTOM CYSTO

## (undated) DEVICE — SUT COAT VCRL 0 27IN CT-1 ABSRB VLT 36MM 1/2

## (undated) DEVICE — YANKAUER,FLEXIBLE HANDLE,REGLR CAPACITY: Brand: MEDLINE INDUSTRIES, INC.

## (undated) DEVICE — SHEET,DRAPE,53X77,STERILE: Brand: MEDLINE

## (undated) DEVICE — GLOVE SUR 6.5 SENSICARE PI PIP CRM PWD F

## (undated) DEVICE — 1016 S-DRAPE IRRIG POUCH 10/BOX: Brand: STERI-DRAPE™

## (undated) DEVICE — GLOVE SUR 7 SENSICARE PI PIP GRN PWD F

## (undated) DEVICE — INSUFFLATION NEEDLE TO ESTABLISH PNEUMOPERITONEUM.: Brand: INSUFFLATION NEEDLE

## (undated) DEVICE — TUR Y CYSTO TUBING SET IRRIG L96IN

## (undated) DEVICE — GAMMEX® PI HYBRID SIZE 6, STERILE POWDER-FREE SURGICAL GLOVE, POLYISOPRENE AND NEOPRENE BLEND: Brand: GAMMEX

## (undated) DEVICE — AIRSEAL 8 MM ACCESS PORT AND LOW PROFILE OBTURATOR WITH BLADELESS OPTICAL TIP, 120 MM LENGTH: Brand: AIRSEAL

## (undated) DEVICE — MEDI-VAC NON-CONDUCTIVE SUCTION TUBING: Brand: CARDINAL HEALTH

## (undated) DEVICE — SUT PDS II 2-0 27IN ABSRB VLT L26MM CT-2

## (undated) DEVICE — COLUMN DRAPE

## (undated) DEVICE — SKIN PREP TRAY 4 COMPARTM TRAY: Brand: MEDLINE INDUSTRIES, INC.

## (undated) DEVICE — ABSORBABLE WOUND CLOSURE DEVICE: Brand: V-LOC 180

## (undated) DEVICE — HOOK RETRCT 5MM SHRP E STAY DISP LONE STAR

## (undated) DEVICE — BLADELESS OBTURATOR: Brand: WECK VISTA

## (undated) DEVICE — BIPOLAR GRASPER, LONG: Brand: ENDOWRIST

## (undated) DEVICE — SOLUTION IV 1000ML DIL ST H2O

## (undated) DEVICE — PAD POS 36IN DISP SURGYPAD

## (undated) DEVICE — STERILE H2O FOR IRRIG .

## (undated) DEVICE — DRAPE,LITHOTOMY,STERILE: Brand: MEDLINE

## (undated) DEVICE — SUT PROL 0 30IN CT-2 NABSRB BLU L26MM 1/2 CIR

## (undated) DEVICE — MONOPOLAR CURVED SCISSORS: Brand: ENDOWRIST

## (undated) DEVICE — ELECTRODE ES RET 2 PATE W/ 10FT CRD MPLR DISP

## (undated) DEVICE — ADHESIVE SKIN TOP FOR WND CLSR DERMBND ADV

## (undated) DEVICE — DRAPE,ABDOMINAL,MAJOR,STERILE: Brand: MEDLINE

## (undated) DEVICE — VIOLET BRAIDED (POLYGLACTIN 910), SYNTHETIC ABSORBABLE SUTURE: Brand: COATED VICRYL

## (undated) DEVICE — APPLICATOR ENDOSCP FOR ADJUNCTIVE HEMSTAS

## (undated) DEVICE — ROBOTIC: Brand: MEDLINE INDUSTRIES, INC.

## (undated) DEVICE — PAD PREP 24X43.5IN W/ PCH

## (undated) DEVICE — LARGE NEEDLE DRIVER: Brand: ENDOWRIST

## (undated) DEVICE — ARM DRAPE

## (undated) DEVICE — SUT MCRYL 4-0 18IN PS-2 ABSRB UD 19MM 3/8 CIR

## (undated) DEVICE — ANTIBACTERIAL UNDYED BRAIDED (POLYGLACTIN 910), SYNTHETIC ABSORBABLE SUTURE: Brand: COATED VICRYL

## (undated) DEVICE — SOLUTION IRRIG 3000ML 0.9% NACL FLX CONT

## (undated) DEVICE — JELLY,LUBE,STERILE,FLIP TOP,TUBE,2-OZ: Brand: MEDLINE

## (undated) DEVICE — TRAY CATH FOLEY 16FR INCLUDE BARDX IC COMPLT CARE